# Patient Record
Sex: MALE | Race: WHITE | URBAN - METROPOLITAN AREA
[De-identification: names, ages, dates, MRNs, and addresses within clinical notes are randomized per-mention and may not be internally consistent; named-entity substitution may affect disease eponyms.]

---

## 2019-03-05 NOTE — TELEPHONE ENCOUNTER
RECORDS RECEIVED FROM: low back pain/sciatica/per pt/no imaging yet, no previous surgery   DATE RECEIVED: 03/05/19    NOTES STATUS DETAILS   OFFICE NOTE from referring provider N/A    OFFICE NOTE from other specialist N/A    DISCHARGE SUMMARY from hospital N/A    DISCHARGE REPORT from the ER N/A    OPERATIVE REPORT N/A    MEDICATION LIST Care Everywhere    IMPLANT RECORD/STICKER N/A    LABS     CBC/DIFF N/A    CULTURES N/A    INJECTIONS DONE IN RADIOLOGY N/A    MRI N/A    CT SCAN N/A    XRAYS (IMAGES & REPORTS) N/A    TUMOR     PATHOLOGY  Slides & report N/A      03/05/19  5:16 PM no records found for this c.c.

## 2019-03-08 ENCOUNTER — ANCILLARY PROCEDURE (OUTPATIENT)
Dept: GENERAL RADIOLOGY | Facility: CLINIC | Age: 39
End: 2019-03-08
Attending: PREVENTIVE MEDICINE
Payer: COMMERCIAL

## 2019-03-08 ENCOUNTER — PRE VISIT (OUTPATIENT)
Dept: ORTHOPEDICS | Facility: CLINIC | Age: 39
End: 2019-03-08

## 2019-03-08 ENCOUNTER — OFFICE VISIT (OUTPATIENT)
Dept: ORTHOPEDICS | Facility: CLINIC | Age: 39
End: 2019-03-08
Payer: COMMERCIAL

## 2019-03-08 DIAGNOSIS — M54.16 LUMBAR RADICULAR PAIN: Primary | ICD-10-CM

## 2019-03-08 DIAGNOSIS — M54.16 LUMBAR RADICULAR PAIN: ICD-10-CM

## 2019-03-08 RX ORDER — DICLOFENAC SODIUM 75 MG/1
75 TABLET, DELAYED RELEASE ORAL 2 TIMES DAILY
Qty: 40 TABLET | Refills: 0 | Status: SHIPPED | OUTPATIENT
Start: 2019-03-08 | End: 2019-03-13

## 2019-03-08 RX ORDER — IBUPROFEN 200 MG
200-400 TABLET ORAL
COMMUNITY

## 2019-03-08 RX ORDER — PRAVASTATIN SODIUM 20 MG
TABLET ORAL
Refills: 2 | COMMUNITY
Start: 2019-03-05

## 2019-03-08 RX ORDER — GLIPIZIDE 5 MG/1
TABLET, FILM COATED, EXTENDED RELEASE ORAL
COMMUNITY
Start: 2019-03-06

## 2019-03-08 RX ORDER — GABAPENTIN 300 MG/1
CAPSULE ORAL
Refills: 3 | COMMUNITY
Start: 2019-02-22

## 2019-03-08 RX ORDER — LISINOPRIL 5 MG/1
TABLET ORAL
Refills: 2 | COMMUNITY
Start: 2019-03-05

## 2019-03-08 RX ORDER — METFORMIN HCL 500 MG
TABLET, EXTENDED RELEASE 24 HR ORAL
COMMUNITY
Start: 2019-02-06

## 2019-03-08 NOTE — LETTER
3/8/2019       RE: Arnaldo Sierra  [address]  [facility] [address]     Dear Colleague,    Thank you for referring your patient, Arnaldo Sierra, to [facility]. Please see a copy of my visit note below.    HISTORY OF PRESENT ILLNESS  Mr. Clovis Sierra is a pleasant 38 year old year old male who presents to clinic today with lumbar radicular pain x 6 months  Has pain in low back that causes spasms  Has radiation into left leg at times  He had his first episodes over 6 months ago, and it resolved, but then recurred in the past 2 weeks  He has daily stiffness and pain in his low back  Arnaldo explains that he uses the inversion table  Prior to last summer, never had back issues.    Location: low back    Modifying factors:  resting and non-use makes it better, movement and use makes it worse  Associated signs & symptoms: none  Previous similar pain: yes  Exercise: lifting weights and cardiovascular on machine  Additional history: as documented    MEDICAL HISTORY  There is no problem list on file for this patient.    Current Outpatient Medications   Medication Sig Dispense Refill     gabapentin (NEURONTIN) 300 MG capsule TK 1 C PO TID  3     glipiZIDE (GLUCOTROL XL) 5 MG 24 hr tablet TAKE 1 TABLET BY MOUTH DAILY       ibuprofen (ADVIL/MOTRIN) 200 MG tablet Take 200-400 mg by mouth       lisinopril (PRINIVIL/ZESTRIL) 5 MG tablet   2     metFORMIN (GLUCOPHAGE-XR) 500 MG 24 hr tablet TAKE 4 TABLETS BY MOUTH DAILY WITH BREAKFAST       pravastatin (PRAVACHOL) 20 MG tablet   2       Not on File    No family history on file.    Additional medical/Social/Surgical histories reviewed in [facility] and updated as appropriate.     REVIEW OF SYSTEMS (3/8/2019)  10 point ROS of systems including Constitutional, Eyes, Respiratory, Cardiovascular, Gastroenterology, Genitourinary, Integumentary, Musculoskeletal, Psychiatric were all negative except for  pertinent positives noted in my HPI.     PHYSICAL EXAM  vss  General  - normal appearance, in no obvious distress  CV  - normal peripheral perfusion  Pulm  - normal respiratory pattern, non-labored  Musculoskeletal - lumbar spine  - stance: normal gait without limp, no obvious leg length discrepancy, normal heel and toe walk  - inspection: normal bone and joint alignment, no obvious scoliosis  - palpation: no paravertebral or bony tenderness  - ROM: flexion exacerbates pain, normal extension, sidebending, rotation  - strength: lower extremities 5/5 in all planes  - special tests:  (+) straight leg raise  (+) slump test  Neuro  - patellar and Achilles DTRs 2+ bilaterally, lower extremity sensory deficit throughout L5 distribution, grossly normal coordination, normal muscle tone  Skin  - no ecchymosis, erythema, warmth, or induration, no obvious rash  Psych  - interactive, appropriate, normal mood and affect  ASSESSMENT & PLAN  37 yo male with lumbar ddd, radicular pain  Reviewed xrays: shows some ddd  Start diclofenac  Cont. HEP  Consider lumbar MRI    Abdifatah Nixon MD, CAQSM

## 2019-03-08 NOTE — NURSING NOTE
Reason For Visit:   Chief Complaint   Patient presents with     Consult For     Low back pain/sciatica       There were no vitals taken for this visit.    Pain Assessment  Patient Currently in Pain: Yes  0-10 Pain Scale: 2(Pain increases with certain activities.)    Virginia Cota ATC

## 2019-03-08 NOTE — PROGRESS NOTES
HISTORY OF PRESENT ILLNESS  Mr. Clovis Sierra is a pleasant 38 year old year old male who presents to clinic today with lumbar radicular pain x 6 months  Has pain in low back that causes spasms  Has radiation into left leg at times  He had his first episodes over 6 months ago, and it resolved, but then recurred in the past 2 weeks  He has daily stiffness and pain in his low back  Issam explains that he uses the inversion table  Prior to last summer, never had back issues.    Location: low back    Modifying factors:  resting and non-use makes it better, movement and use makes it worse  Associated signs & symptoms: none  Previous similar pain: yes  Exercise: lifting weights and cardiovascular on machine  Additional history: as documented    MEDICAL HISTORY  There is no problem list on file for this patient.      Current Outpatient Medications   Medication Sig Dispense Refill     gabapentin (NEURONTIN) 300 MG capsule TK 1 C PO TID  3     glipiZIDE (GLUCOTROL XL) 5 MG 24 hr tablet TAKE 1 TABLET BY MOUTH DAILY       ibuprofen (ADVIL/MOTRIN) 200 MG tablet Take 200-400 mg by mouth       lisinopril (PRINIVIL/ZESTRIL) 5 MG tablet   2     metFORMIN (GLUCOPHAGE-XR) 500 MG 24 hr tablet TAKE 4 TABLETS BY MOUTH DAILY WITH BREAKFAST       pravastatin (PRAVACHOL) 20 MG tablet   2       Not on File    No family history on file.    Additional medical/Social/Surgical histories reviewed in [facility] and updated as appropriate.     REVIEW OF SYSTEMS (3/8/2019)  10 point ROS of systems including Constitutional, Eyes, Respiratory, Cardiovascular, Gastroenterology, Genitourinary, Integumentary, Musculoskeletal, Psychiatric were all negative except for pertinent positives noted in my HPI.     PHYSICAL EXAM  vss  General  - normal appearance, in no obvious distress  CV  - normal peripheral perfusion  Pulm  - normal respiratory pattern, non-labored  Musculoskeletal - lumbar spine  - stance: normal gait without limp, no obvious leg length  discrepancy, normal heel and toe walk  - inspection: normal bone and joint alignment, no obvious scoliosis  - palpation: no paravertebral or bony tenderness  - ROM: flexion exacerbates pain, normal extension, sidebending, rotation  - strength: lower extremities 5/5 in all planes  - special tests:  (+) straight leg raise  (+) slump test  Neuro  - patellar and Achilles DTRs 2+ bilaterally, lower extremity sensory deficit throughout L5 distribution, grossly normal coordination, normal muscle tone  Skin  - no ecchymosis, erythema, warmth, or induration, no obvious rash  Psych  - interactive, appropriate, normal mood and affect  ASSESSMENT & PLAN  39 yo male with lumbar ddd, radicular pain  Reviewed xrays: shows some ddd  Start diclofenac  Cont. HEP  Consider lumbar MRI    Abdifatah Nixon MD, CAQSM

## 2019-03-13 DIAGNOSIS — M54.16 LUMBAR RADICULAR PAIN: ICD-10-CM

## 2019-03-13 RX ORDER — DICLOFENAC SODIUM 75 MG/1
75 TABLET, DELAYED RELEASE ORAL 2 TIMES DAILY
Qty: 40 TABLET | Refills: 1 | Status: SHIPPED | OUTPATIENT
Start: 2019-03-13

## 2020-03-11 ENCOUNTER — HEALTH MAINTENANCE LETTER (OUTPATIENT)
Age: 40
End: 2020-03-11

## 2021-01-03 ENCOUNTER — HEALTH MAINTENANCE LETTER (OUTPATIENT)
Age: 41
End: 2021-01-03

## 2021-04-25 ENCOUNTER — HEALTH MAINTENANCE LETTER (OUTPATIENT)
Age: 41
End: 2021-04-25

## 2021-10-10 ENCOUNTER — HEALTH MAINTENANCE LETTER (OUTPATIENT)
Age: 41
End: 2021-10-10

## 2022-05-21 ENCOUNTER — HEALTH MAINTENANCE LETTER (OUTPATIENT)
Age: 42
End: 2022-05-21

## 2022-09-18 ENCOUNTER — HEALTH MAINTENANCE LETTER (OUTPATIENT)
Age: 42
End: 2022-09-18

## 2023-06-04 ENCOUNTER — HEALTH MAINTENANCE LETTER (OUTPATIENT)
Age: 43
End: 2023-06-04